# Patient Record
Sex: MALE | URBAN - METROPOLITAN AREA
[De-identification: names, ages, dates, MRNs, and addresses within clinical notes are randomized per-mention and may not be internally consistent; named-entity substitution may affect disease eponyms.]

---

## 2020-11-03 ENCOUNTER — OFFICE VISIT (OUTPATIENT)
Dept: URBAN - METROPOLITAN AREA CLINIC 13 | Facility: CLINIC | Age: 70
End: 2020-11-03
Payer: MEDICARE

## 2020-11-03 DIAGNOSIS — H25.11 AGE-RELATED NUCLEAR CATARACT, RIGHT EYE: ICD-10-CM

## 2020-11-03 PROCEDURE — 92014 COMPRE OPH EXAM EST PT 1/>: CPT | Performed by: OPHTHALMOLOGY

## 2020-11-03 PROCEDURE — 92134 CPTRZ OPH DX IMG PST SGM RTA: CPT | Performed by: OPHTHALMOLOGY

## 2020-11-03 PROCEDURE — 67028 INJECTION EYE DRUG: CPT | Performed by: OPHTHALMOLOGY

## 2020-11-03 ASSESSMENT — INTRAOCULAR PRESSURE
OD: 25
OS: 24

## 2020-11-03 NOTE — IMPRESSION/PLAN
Impression: Type 2 diab with mod nonp rtnop without macular edema, bi: Q27.9789. Bilateral.
last A1C 7.0 Plan: Tight BP/BS control encouraged. Exam and OCT OS demonstrate a small perifoveal cyst.  It is unclear at this point if this is related to DR or if it represents a degenerative cyst.  This has remained stable but will continue to observe closely.

## 2020-11-03 NOTE — IMPRESSION/PLAN
Impression: Age-related nuclear cataract, right eye: H25.11. Right. Plan: Visually significant. It appears that the cataract has not worsened with antiVEGF treatment. Cleared to proceed with CE/IOL. Would avoid MFIOL in patient with history of macular disease. Patient has follow-up with Dr. Bárbara Cruz in ~1 month.

## 2020-11-03 NOTE — IMPRESSION/PLAN
Impression: Glaucoma: H40.9. Bilateral.
s/p LPI, OS
s/p PKE/ABIC
RNFL 11/3/2020- 66,57 Plan: IOP elevated on exam today. Continue timolol, simbrinza, and Lumigan. Recommend follow up with Dr. Jacinda Ma as IOP remains elevated.

## 2020-11-03 NOTE — IMPRESSION/PLAN
Impression: Central retinal vein occls, right eye, with macular edema: H34.8110. Right. Status: Symptomatic.
s/p Avastin last 05/25/2020
- worse at 13 wks on 8/25/20 Plan: Exam and OCT confirm CRVO with improved CME at 10 wks. He continues to have a great response to anti-VEGF injection. Discussed findings and options with patient. Recommend repeat treatment today and continue with 10-11 week evaluations. R/B/A's discussed. Patient understands and elects to proceed. Intravitreal Avastin injection performed today OD without complication.  

RTC: 10-11 weeks FU with OCT mac /poss avastin x CRVO OD

## 2021-01-12 ENCOUNTER — OFFICE VISIT (OUTPATIENT)
Dept: URBAN - METROPOLITAN AREA CLINIC 13 | Facility: CLINIC | Age: 71
End: 2021-01-12
Payer: MEDICARE

## 2021-01-12 PROCEDURE — 99214 OFFICE O/P EST MOD 30 MIN: CPT | Performed by: OPHTHALMOLOGY

## 2021-01-12 PROCEDURE — 92134 CPTRZ OPH DX IMG PST SGM RTA: CPT | Performed by: OPHTHALMOLOGY

## 2021-01-12 PROCEDURE — 67028 INJECTION EYE DRUG: CPT | Performed by: OPHTHALMOLOGY

## 2021-01-12 ASSESSMENT — INTRAOCULAR PRESSURE
OD: 34
OS: 28

## 2021-01-12 NOTE — IMPRESSION/PLAN
Impression: Central retinal vein occls, right eye, with macular edema: H34.8110. Right. Status: Symptomatic. -- worse at 13 weeks on 8/25/20
-s/p Avastin last 11/03/2020 Plan: Exam and OCT confirm CRVO with worse CME at 11 wks. He continues to have a great response to anti-VEGF injection. Discussed findings and options with patient. Recommend repeat treatment today and taper to 6 week evaluations. R/B/A's discussed. Patient understands and elects to proceed. Intravitreal Avastin injection performed today OD without complication.  

RTC: 6 weeks FU with OCT [place] Fass Gilbert Paulie Avastin x CRVO OD

## 2021-01-12 NOTE — IMPRESSION/PLAN
Impression: Type 2 diab with mod nonp rtnop without macular edema, bi: B77.2385. Bilateral.
last A1C 7.0 Plan: Tight BP/BS control encouraged. Exam and OCT OS demonstrate a small perifoveal cyst.  It is unclear at this point if this is related to DR or if it represents a degenerative cyst.  This has remained stable but will continue to observe closely.

## 2021-01-12 NOTE — IMPRESSION/PLAN
Impression: Glaucoma: H40.9. Bilateral.
s/p LPI, OS
s/p PKE/ABIC
RNFL 11/3/2020- 66,57 Plan: IOP elevated on exam today. Continue timolol, simbrinza, and Lumigan. Recommend follow up with Dr. Rolo Finn as IOP remains elevated even after trab and cataract surgery.

## 2021-01-12 NOTE — IMPRESSION/PLAN
Impression: Presence of intraocular lens: Z96.1 OU. Plan: Unclear why there is a vision decline after cataract surgery OD. There is mild recurrent CME, however, this does not seem to explain decline to 20/400. Will follow closely.

## 2021-02-23 ENCOUNTER — OFFICE VISIT (OUTPATIENT)
Dept: URBAN - METROPOLITAN AREA CLINIC 13 | Facility: CLINIC | Age: 71
End: 2021-02-23
Payer: MEDICARE

## 2021-02-23 PROCEDURE — 92134 CPTRZ OPH DX IMG PST SGM RTA: CPT | Performed by: OPHTHALMOLOGY

## 2021-02-23 PROCEDURE — 92012 INTRM OPH EXAM EST PATIENT: CPT | Performed by: OPHTHALMOLOGY

## 2021-02-23 PROCEDURE — 67028 INJECTION EYE DRUG: CPT | Performed by: OPHTHALMOLOGY

## 2021-02-23 ASSESSMENT — INTRAOCULAR PRESSURE
OS: 26
OD: 27

## 2021-02-23 NOTE — IMPRESSION/PLAN
Impression: Type 2 diab with mod nonp rtnop without macular edema, bi: S15.5132. Bilateral.
last A1C 7.0 Plan: May also be contributing to he CME OD.   See plan for CRVO

## 2021-02-23 NOTE — IMPRESSION/PLAN
Impression: Central retinal vein occls, right eye, with macular edema: H34.8110. Right. Status: Symptomatic. -- worse at 13 weeks on 8/25/20
-s/p Avastin last 11/03/2020 Plan: Exam and OCT confirm CRVO with worse CME at 6 wks after his last Avastin injection. Discussed findings and options with patient. Recommend repeat treatment today and taper to 4 week evaluations. May consider switch to [facility] if CME continues to persist.  R/B/A's discussed. Patient understands and elects to proceed. Intravitreal Avastin injection performed today OD without complication.  

RTC: 4 weeks FU with OCT Vince Metzger Dub Avastin vs Eylea x CRVO OD

## 2021-03-23 ENCOUNTER — OFFICE VISIT (OUTPATIENT)
Dept: URBAN - METROPOLITAN AREA CLINIC 13 | Facility: CLINIC | Age: 71
End: 2021-03-23
Payer: MEDICARE

## 2021-03-23 PROCEDURE — 67028 INJECTION EYE DRUG: CPT | Performed by: OPHTHALMOLOGY

## 2021-03-23 PROCEDURE — 92134 CPTRZ OPH DX IMG PST SGM RTA: CPT | Performed by: OPHTHALMOLOGY

## 2021-03-23 PROCEDURE — 92012 INTRM OPH EXAM EST PATIENT: CPT | Performed by: OPHTHALMOLOGY

## 2021-03-23 ASSESSMENT — INTRAOCULAR PRESSURE
OD: 16
OS: 20

## 2021-03-23 NOTE — IMPRESSION/PLAN
Impression: Central retinal vein occls, right eye, with macular edema: H34.8110. Right. Status: Symptomatic. -- worse at 13 weeks on 8/25/20
-s/p Avastin last 02/23/2021 Plan: Exam and OCT confirm CRVO with improved CME with taper to 4 wks after his last Avastin injection. Discussed findings and options with patient. Recommend switch to [facility] today and continue with 4 week evaluations. May consider extending time between visits if CME resolves. R/B/A's discussed. Patient understands and elects to proceed. Intravitreal Eylea injection performed today OD without complication.  

RTC: 4 weeks FU with OCT Sheryl Peer Jerri Blizzard Eylea x CRVO OD

## 2021-03-23 NOTE — IMPRESSION/PLAN
Impression: Type 2 diab with mod nonp rtnop without macular edema, bi: V61.3036. Bilateral.
last A1C 7.0 Plan: May also be contributing to he CME OD.   See plan for CRVO

## 2021-03-23 NOTE — IMPRESSION/PLAN
Impression: Glaucoma: H40.9. Bilateral.
s/p LPI, OS
s/p PKE/ABIC
RNFL 11/3/2020- 66,57 Plan: IOP improved on exam today. Continue timolol, simbrinza, and Lumigan. Recommend follow up with Dr. Ximena Yanes as IOP remains elevated even after trab and cataract surgery.

## 2021-04-20 ENCOUNTER — OFFICE VISIT (OUTPATIENT)
Dept: URBAN - METROPOLITAN AREA CLINIC 13 | Facility: CLINIC | Age: 71
End: 2021-04-20
Payer: MEDICARE

## 2021-04-20 DIAGNOSIS — H40.9 GLAUCOMA: ICD-10-CM

## 2021-04-20 PROCEDURE — 67028 INJECTION EYE DRUG: CPT | Performed by: OPHTHALMOLOGY

## 2021-04-20 PROCEDURE — 92012 INTRM OPH EXAM EST PATIENT: CPT | Performed by: OPHTHALMOLOGY

## 2021-04-20 ASSESSMENT — INTRAOCULAR PRESSURE
OS: 20
OD: 19

## 2021-04-20 NOTE — IMPRESSION/PLAN
Impression: Type 2 diab with mod nonp rtnop without macular edema, bi: H75.6047. Bilateral.
last A1C 7.0 Plan: May also be contributing to he CME OD.   See plan for CRVO

## 2021-04-20 NOTE — IMPRESSION/PLAN
Impression: Central retinal vein occls, right eye, with macular edema: H34.8110. Right. Status: Symptomatic. -- worse at 13 weeks on 8/25/20
-s/p Avastin last 02/23/2021 Plan: Exam and OCT confirm CRVO with improved CME with taper to 4 wks after his last Eylea injection. Discussed findings and options with patient. Recommend Eylea today and extend to 6 week evaluation. May consider extending time between visits if CME resolves. R/B/A's discussed. Patient understands and elects to proceed. Intravitreal Eylea injection performed today OD without complication.  

RTC: 6 weeks FU with OCT Karina Sommers Eylea x CRVO OD

## 2021-04-20 NOTE — IMPRESSION/PLAN
Impression: Glaucoma: H40.9. Bilateral.
s/p LPI, OS
s/p PKE/ABIC
RNFL 11/3/2020- 66,57
s/p glaucoma tube exchange Plan: IOP improved on exam today. Continue timolol, simbrinza, and Lumigan.  Recommend follow up with Dr. Deja Peterson as IOP remains borderline

## 2021-04-20 NOTE — IMPRESSION/PLAN
Impression: Presence of intraocular lens: Z96.1 OU. Plan: Vision improved; patient has diplopia that may be related to glaucoma surgery; observe for now.

## 2021-06-08 ENCOUNTER — OFFICE VISIT (OUTPATIENT)
Dept: URBAN - METROPOLITAN AREA CLINIC 13 | Facility: CLINIC | Age: 71
End: 2021-06-08
Payer: MEDICARE

## 2021-06-08 DIAGNOSIS — H34.8110 CENTRAL RETINAL VEIN OCCLS, RIGHT EYE, WITH MACULAR EDEMA: Primary | ICD-10-CM

## 2021-06-08 PROCEDURE — 92134 CPTRZ OPH DX IMG PST SGM RTA: CPT | Performed by: OPHTHALMOLOGY

## 2021-06-08 PROCEDURE — 92012 INTRM OPH EXAM EST PATIENT: CPT | Performed by: OPHTHALMOLOGY

## 2021-06-08 PROCEDURE — 67028 INJECTION EYE DRUG: CPT | Performed by: OPHTHALMOLOGY

## 2021-06-08 ASSESSMENT — INTRAOCULAR PRESSURE
OD: 17
OS: 16

## 2021-06-08 NOTE — IMPRESSION/PLAN
Impression: Central retinal vein occls, right eye, with macular edema: H34.8110. Right. Status: Symptomatic. -- worse at 13 weeks on 8/25/20
-s/p Avastin last 02/23/2021
-s/p Eylea last 04/20/2021 Plan: Exam and OCT confirm CRVO with improved CME with extend to 6 wks after his last Eylea injection. Discussed findings and options with patient. Recommend Eylea today and extend to 7-8 week evaluation. May consider extending time between visits if CME resolves. R/B/A's discussed. Patient understands and elects to proceed. Intravitreal Eylea injection performed today OD without complication.  

RTC: 7-8 weeks FU with OCT Beth Arnett Eylea x CRVO OD

## 2021-06-08 NOTE — IMPRESSION/PLAN
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 Bilateral.
last A1C 7.0 Plan: May also be contributing to he CME OD.   See plan for CRVO

## 2021-06-08 NOTE — IMPRESSION/PLAN
Impression: Presence of intraocular lens: Z96.1 OU. Plan: Vision improved; patient has improving diplopia that may be related to glaucoma surgery; observe for now.

## 2021-06-08 NOTE — IMPRESSION/PLAN
Impression: Glaucoma: H40.9. Bilateral.
s/p LPI, OS
s/p PKE/ABIC
RNFL 11/3/2020- 66,57
s/p glaucoma tube exchange Plan: IOP improved on exam today. Continue timolol, simbrinza, and Lumigan.  Recommend follow up with Dr. Josee Valdivia as IOP remains borderline

## 2021-07-27 ENCOUNTER — OFFICE VISIT (OUTPATIENT)
Dept: URBAN - METROPOLITAN AREA CLINIC 13 | Facility: CLINIC | Age: 71
End: 2021-07-27
Payer: MEDICARE

## 2021-07-27 PROCEDURE — 92134 CPTRZ OPH DX IMG PST SGM RTA: CPT | Performed by: OPHTHALMOLOGY

## 2021-07-27 PROCEDURE — 92012 INTRM OPH EXAM EST PATIENT: CPT | Performed by: OPHTHALMOLOGY

## 2021-07-27 PROCEDURE — 67028 INJECTION EYE DRUG: CPT | Performed by: OPHTHALMOLOGY

## 2021-07-27 ASSESSMENT — INTRAOCULAR PRESSURE
OS: 16
OD: 15

## 2021-07-27 NOTE — IMPRESSION/PLAN
Impression: Central retinal vein occls, right eye, with macular edema: H34.8110. Right. Status: Symptomatic. -- worse at 13 weeks on 8/25/20
-s/p Avastin last 02/23/2021
-s/p Eylea last 06/08/2021 Plan: Exam and OCT confirm CRVO with improved CME with extend to 7 wks after his last Eylea injection. Discussed findings and options with patient. Recommend Eylea today and extend to 8 week evaluation. May consider extending time between visits if CME resolves. R/B/A's discussed. Patient understands and elects to proceed. Intravitreal Eylea injection performed today OD without complication.  

RTC: 8 weeks FU with OCT Vince Metzger Dub Eylea x CRVO OD

## 2021-07-27 NOTE — IMPRESSION/PLAN
Impression: Glaucoma: H40.9. Bilateral.
s/p LPI, OS
s/p PKE/ABIC
RNFL 11/3/2020- 66,57
s/p glaucoma tube exchange Plan: IOP improved on exam today OD. Still borderline OS. Continue timolol, simbrinza, and Lumigan.  Recommend follow up with Dr. Chencho Gill as IOP remains borderline

## 2021-09-21 ENCOUNTER — OFFICE VISIT (OUTPATIENT)
Dept: URBAN - METROPOLITAN AREA CLINIC 13 | Facility: CLINIC | Age: 71
End: 2021-09-21
Payer: MEDICARE

## 2021-09-21 DIAGNOSIS — E11.3393 TYPE 2 DIAB WITH MOD NONP RTNOP WITHOUT MACULAR EDEMA, BI: ICD-10-CM

## 2021-09-21 PROCEDURE — 92012 INTRM OPH EXAM EST PATIENT: CPT | Performed by: OPHTHALMOLOGY

## 2021-09-21 PROCEDURE — 67028 INJECTION EYE DRUG: CPT | Performed by: OPHTHALMOLOGY

## 2021-09-21 PROCEDURE — 92134 CPTRZ OPH DX IMG PST SGM RTA: CPT | Performed by: OPHTHALMOLOGY

## 2021-09-21 ASSESSMENT — INTRAOCULAR PRESSURE
OS: 18
OD: 16

## 2021-09-21 NOTE — IMPRESSION/PLAN
Impression: Central retinal vein occls, right eye, with macular edema: H34.8110. Right. Status: Symptomatic. -- worse at 13 weeks on 8/25/20
-s/p Avastin last 02/23/2021
-s/p Eylea last 07/27/2021 Plan: Exam and OCT confirm CRVO with resolved CME with extend to 8 wks after his last Eylea injection. Discussed findings and options with patient. Recommend Eylea today and extend to 10 week evaluation. R/B/A's discussed. Patient understands and elects to proceed. Intravitreal Eylea injection performed today OD without complication.  

RTC: 10 weeks FU with OCT Elaine Slater Eylea x CRVO OD

## 2021-09-21 NOTE — IMPRESSION/PLAN
Impression: Glaucoma: H40.9. Bilateral.
s/p LPI, OS
s/p PKE/ABIC
RNFL 11/3/2020- 66,57
s/p glaucoma tube exchange Plan: IOP improved on exam today OD. Still borderline OS. Continue timolol, simbrinza, and Lumigan and now rocklatan.  Recommend follow up with Dr. Vivien Sood as IOP remains borderline

## 2021-09-21 NOTE — IMPRESSION/PLAN
Impression: Type 2 diab with mod nonp rtnop without macular edema, bi: Q41.1865. Bilateral.
last A1C 7.0 Plan: May also be contributing to he CME OD.   See plan for CRVO

## 2021-11-30 ENCOUNTER — OFFICE VISIT (OUTPATIENT)
Dept: URBAN - METROPOLITAN AREA CLINIC 13 | Facility: CLINIC | Age: 71
End: 2021-11-30
Payer: MEDICARE

## 2021-11-30 DIAGNOSIS — Z96.1 PRESENCE OF INTRAOCULAR LENS: ICD-10-CM

## 2021-11-30 PROCEDURE — 67028 INJECTION EYE DRUG: CPT | Performed by: OPHTHALMOLOGY

## 2021-11-30 PROCEDURE — 92134 CPTRZ OPH DX IMG PST SGM RTA: CPT | Performed by: OPHTHALMOLOGY

## 2021-11-30 PROCEDURE — 92014 COMPRE OPH EXAM EST PT 1/>: CPT | Performed by: OPHTHALMOLOGY

## 2021-11-30 ASSESSMENT — INTRAOCULAR PRESSURE
OS: 17
OD: 15

## 2021-11-30 NOTE — IMPRESSION/PLAN
Impression: Central retinal vein occls, right eye, with macular edema: H34.8110. Right. Status: Symptomatic. -- worse at 13 weeks on 8/25/20
-s/p Avastin last 02/23/2021
-s/p Eylea last 09/21/2021 Plan: Exam and OCT confirm CRVO with slightly worse CME with extending from 8 wks to 10 wks after his last Eylea injection. Discussed findings and options with patient. Recommend Eylea today and maintain 8-10 week evaluation. R/B/A's discussed. Patient understands and elects to proceed. Intravitreal Eylea injection performed today OD without complication.  

RTC: 8-10 weeks FU with OCT Anders Anderson Eylea x CRVO OD

## 2021-11-30 NOTE — IMPRESSION/PLAN
Impression: Type 2 diab with mod nonp rtnop without macular edema, bi: A39.2924. Bilateral.
last A1C 7.0 Plan: May also be contributing to he CME OD.   See plan for CRVO

## 2021-11-30 NOTE — IMPRESSION/PLAN
Impression: Glaucoma: H40.9. Bilateral.
s/p LPI, OS, allergy to Alphagan
s/p PKE/ABIC
RNFL 11/3/2020- 66,57
s/p glaucoma tube exchange Plan: IOP improved on exam today OD. Still borderline OS. Continue timolol, simbrinza, and lumigan and now rocklatan.  Recommend follow up with Dr. Jerry Dueñas as IOP remains borderline

## 2022-01-25 ENCOUNTER — OFFICE VISIT (OUTPATIENT)
Dept: URBAN - METROPOLITAN AREA CLINIC 13 | Facility: CLINIC | Age: 72
End: 2022-01-25
Payer: MEDICARE

## 2022-01-25 PROCEDURE — 67028 INJECTION EYE DRUG: CPT | Performed by: OPHTHALMOLOGY

## 2022-01-25 PROCEDURE — 92014 COMPRE OPH EXAM EST PT 1/>: CPT | Performed by: OPHTHALMOLOGY

## 2022-01-25 PROCEDURE — 92134 CPTRZ OPH DX IMG PST SGM RTA: CPT | Performed by: OPHTHALMOLOGY

## 2022-01-25 ASSESSMENT — INTRAOCULAR PRESSURE
OS: 18
OD: 8

## 2022-01-25 NOTE — IMPRESSION/PLAN
Impression: Central retinal vein occls, right eye, with macular edema: H34.8110. Right. Status: Symptomatic. -- worse at 13 weeks on 8/25/20
-s/p Avastin last 02/23/2021
-s/p Eylea last 11/30/2021 Plan: Exam and OCT confirm CRVO with slightly worse CME with extending from 8 wks to 10 wks after his last Eylea injection. Discussed findings and options with patient. Recommend Eylea today and maintain 8-10 week evaluation. R/B/A's discussed. Patient understands and elects to proceed. Intravitreal Eylea injection performed today OD without complication.  

RTC: 8-10 weeks FU with OCT Jocelin Campa Eylea x CRVO OD

## 2022-01-25 NOTE — IMPRESSION/PLAN
Impression: Type 2 diab with mod nonp rtnop without macular edema, bi: N54.8811. Bilateral.
last A1C 7.0 Plan: May also be contributing to he CME OD.   See plan for CRVO

## 2022-01-25 NOTE — IMPRESSION/PLAN
Impression: Glaucoma: H40.9. Bilateral.
s/p LPI, OS, allergy to Alphagan
s/p PKE/ABIC
RNFL 11/3/2020- 66,57
s/p glaucoma tube exchange Plan: IOP improved on exam today OD. Still borderline OS. Continue timolol, simbrinza, and lumigan and now rocklatan.  Recommend follow up with Dr. Gordon Knowles and Kim Akers as IOP remains borderline Patient transported to [address] [place] [address] Street, RN  02/28/19 [id number]

## 2022-03-22 ENCOUNTER — OFFICE VISIT (OUTPATIENT)
Dept: URBAN - METROPOLITAN AREA CLINIC 13 | Facility: CLINIC | Age: 72
End: 2022-03-22
Payer: MEDICARE

## 2022-03-22 PROCEDURE — 67028 INJECTION EYE DRUG: CPT | Performed by: OPHTHALMOLOGY

## 2022-03-22 PROCEDURE — 99214 OFFICE O/P EST MOD 30 MIN: CPT | Performed by: OPHTHALMOLOGY

## 2022-03-22 PROCEDURE — 92134 CPTRZ OPH DX IMG PST SGM RTA: CPT | Performed by: OPHTHALMOLOGY

## 2022-03-22 ASSESSMENT — INTRAOCULAR PRESSURE
OD: 18
OS: 19

## 2022-03-22 NOTE — IMPRESSION/PLAN
Impression: Central retinal vein occls, right eye, with macular edema: H34.8110. Right. Status: Symptomatic. -- worse at 13 weeks on 8/25/20
-s/p Avastin last 02/23/2021
-s/p Eylea last 01/25/2022 Plan: Exam and OCT confirm CRVO with slightly worse CME with extending from 8 wks to 10 wks after his last Eylea injection. Discussed findings and options with patient. Recommend Eylea today and will try to extend to 11-12 week evaluation. R/B/A's discussed. Patient understands and elects to proceed. Intravitreal Eylea injection performed today OD without complication.  

RTC: 11-12 weeks FU with OCT Tej Gayle x CRVO OD

## 2022-03-22 NOTE — IMPRESSION/PLAN
Impression: Type 2 diab with mod nonp rtnop without macular edema, bi: D18.9725. Bilateral.
last A1C 7.0 Plan: May also be contributing to he CME OD.   See plan for CRVO

## 2022-03-22 NOTE — IMPRESSION/PLAN
Impression: Glaucoma: H40.9. Bilateral.
s/p LPI, OS, allergy to Alphagan
s/p PKE/ABIC
RNFL 11/3/2020- 66,57
s/p glaucoma tube exchange Plan: IOP improved on exam today OD. Still borderline OS. Continue timolol, simbrinza, and lumigan (injected as implant OS) and now rocklatan.  Recommend follow up with Dr. Quoc Galeano as IOP remains borderline

## 2022-06-28 ENCOUNTER — OFFICE VISIT (OUTPATIENT)
Dept: URBAN - METROPOLITAN AREA CLINIC 13 | Facility: CLINIC | Age: 72
End: 2022-06-28
Payer: MEDICARE

## 2022-06-28 DIAGNOSIS — E11.3393 TYPE 2 DIAB WITH MOD NONP RTNOP WITHOUT MACULAR EDEMA, BI: ICD-10-CM

## 2022-06-28 DIAGNOSIS — H34.8110 CENTRAL RETINAL VEIN OCCLS, RIGHT EYE, WITH MACULAR EDEMA: Primary | ICD-10-CM

## 2022-06-28 DIAGNOSIS — H40.9 GLAUCOMA: ICD-10-CM

## 2022-06-28 DIAGNOSIS — Z96.1 PRESENCE OF INTRAOCULAR LENS: ICD-10-CM

## 2022-06-28 PROCEDURE — 67028 INJECTION EYE DRUG: CPT | Performed by: OPHTHALMOLOGY

## 2022-06-28 PROCEDURE — 92134 CPTRZ OPH DX IMG PST SGM RTA: CPT | Performed by: OPHTHALMOLOGY

## 2022-06-28 PROCEDURE — 92014 COMPRE OPH EXAM EST PT 1/>: CPT | Performed by: OPHTHALMOLOGY

## 2022-06-28 ASSESSMENT — INTRAOCULAR PRESSURE
OD: 15
OS: 17

## 2022-09-27 ENCOUNTER — OFFICE VISIT (OUTPATIENT)
Dept: URBAN - METROPOLITAN AREA CLINIC 13 | Facility: CLINIC | Age: 72
End: 2022-09-27
Payer: MEDICARE

## 2022-09-27 DIAGNOSIS — Z96.1 PRESENCE OF INTRAOCULAR LENS: ICD-10-CM

## 2022-09-27 DIAGNOSIS — E11.3393 TYPE 2 DIAB WITH MOD NONP RTNOP WITHOUT MACULAR EDEMA, BI: ICD-10-CM

## 2022-09-27 DIAGNOSIS — H40.9 GLAUCOMA: ICD-10-CM

## 2022-09-27 DIAGNOSIS — H34.8110 CENTRAL RETINAL VEIN OCCLS, RIGHT EYE, WITH MACULAR EDEMA: Primary | ICD-10-CM

## 2022-09-27 PROCEDURE — 92134 CPTRZ OPH DX IMG PST SGM RTA: CPT | Performed by: OPHTHALMOLOGY

## 2022-09-27 PROCEDURE — 92012 INTRM OPH EXAM EST PATIENT: CPT | Performed by: OPHTHALMOLOGY

## 2022-09-27 PROCEDURE — 67028 INJECTION EYE DRUG: CPT | Performed by: OPHTHALMOLOGY

## 2022-09-27 ASSESSMENT — INTRAOCULAR PRESSURE
OS: 20
OD: 24

## 2022-09-27 NOTE — IMPRESSION/PLAN
Impression: Central retinal vein occls, right eye, with macular edema: H34.8110. Right. Status: Symptomatic. -- worse at 13 weeks on 8/25/20
-s/p Avastin last 02/23/2021
-s/p Eylea last 06/28/2022 Plan: Exam and OCT confirm CRVO with slightly worse CME with extending from 10 wks to 12 wks after his last Eylea injection. Discussed findings and options with patient. Recommend Eylea today and will maintain 11-12 week evaluation. R/B/A's discussed. Patient understands and elects to proceed. Intravitreal Eylea injection performed today OD without complication.  

RTC: 11-12 weeks FU with OCT Tej Gayle x CRVO OD

## 2022-09-27 NOTE — IMPRESSION/PLAN
Impression: Glaucoma: H40.9. Bilateral.
s/p LPI, OS, allergy to Alphagan
s/p PKE/ABIC
RNFL 11/3/2020- 66,57
s/p glaucoma tube exchange Plan: BOrderline OU. Possible progression of ON cupping OD. Continue timolol, simbrinza, and lumigan (injected as implant OS) and  and now vyzulta (off rocklatan due to irritation).  Recommend follow up with Dr. Pee Land as IOP remains borderline

## 2022-09-27 NOTE — IMPRESSION/PLAN
Impression: Type 2 diab with mod nonp rtnop without macular edema, bi: O34.3459. Bilateral.
last A1C 7.0 Plan: May also be contributing to he CME OD.   See plan for CRVO

## 2022-12-13 ENCOUNTER — OFFICE VISIT (OUTPATIENT)
Dept: URBAN - METROPOLITAN AREA CLINIC 13 | Facility: CLINIC | Age: 72
End: 2022-12-13
Payer: MEDICARE

## 2022-12-13 DIAGNOSIS — H40.9 GLAUCOMA: ICD-10-CM

## 2022-12-13 DIAGNOSIS — Z96.1 PRESENCE OF INTRAOCULAR LENS: ICD-10-CM

## 2022-12-13 DIAGNOSIS — E11.3393 TYPE 2 DIAB WITH MOD NONP RTNOP WITHOUT MACULAR EDEMA, BI: ICD-10-CM

## 2022-12-13 DIAGNOSIS — H34.8110 CENTRAL RETINAL VEIN OCCLS, RIGHT EYE, WITH MACULAR EDEMA: Primary | ICD-10-CM

## 2022-12-13 PROCEDURE — 92134 CPTRZ OPH DX IMG PST SGM RTA: CPT | Performed by: OPHTHALMOLOGY

## 2022-12-13 PROCEDURE — 92012 INTRM OPH EXAM EST PATIENT: CPT | Performed by: OPHTHALMOLOGY

## 2022-12-13 PROCEDURE — 67028 INJECTION EYE DRUG: CPT | Performed by: OPHTHALMOLOGY

## 2022-12-13 ASSESSMENT — INTRAOCULAR PRESSURE
OS: 13
OD: 12

## 2022-12-13 NOTE — IMPRESSION/PLAN
Impression: Central retinal vein occls, right eye, with macular edema: H34.8110. Right. Status: Symptomatic. -- worse at 13 weeks on 8/25/20
-s/p Avastin last 02/23/2021
-s/p Eylea last 09/27/2022 Plan: Exam and OCT confirm CRVO with improved CME tapering from 12 wks to 10 wks after his last Eylea injection. Discussed findings and options with patient. Recommend Eylea today and will maintain 10-11week evaluation. R/B/A's discussed. Patient understands and elects to proceed. Intravitreal Eylea injection performed today OD without complication.  

RTC: 10-11 weeks FU with OCT Throckmorton Fass [place] Paulie Eylea x CRVO OD

## 2022-12-13 NOTE — IMPRESSION/PLAN
Impression: Glaucoma: H40.9. Bilateral.
s/p LPI, OS, allergy to Alphagan
s/p PKE/ABIC
RNFL 11/3/2020- 66,57
s/p glaucoma tube exchange Plan: IOP improved OU. Possible progression of ON cupping OD. Continue timolol, simbrinza, and lumigan (injected as implant OS) and  and now vyzulta (off rocklatan due to irritation).  Recommend follow up with Dr. Maura Pacheco

## 2022-12-13 NOTE — IMPRESSION/PLAN
Impression: Presence of intraocular lens: Z96.1 OU. Plan: Vision improved; patient has continued diplopia that may be related to glaucoma surgery; observe for now.

## 2022-12-13 NOTE — IMPRESSION/PLAN
Impression: Type 2 diab with mod nonp rtnop without macular edema, bi: B10.6271. Bilateral.
last A1C 7.0 Plan: May also be contributing to he CME OD.   See plan for CRVO

## 2023-02-21 ENCOUNTER — OFFICE VISIT (OUTPATIENT)
Dept: URBAN - METROPOLITAN AREA CLINIC 13 | Facility: CLINIC | Age: 73
End: 2023-02-21
Payer: MEDICARE

## 2023-02-21 DIAGNOSIS — H40.9 GLAUCOMA: ICD-10-CM

## 2023-02-21 DIAGNOSIS — H34.8110 CENTRAL RETINAL VEIN OCCLS, RIGHT EYE, WITH MACULAR EDEMA: Primary | ICD-10-CM

## 2023-02-21 DIAGNOSIS — E11.3312 TYPE 2 DIAB WITH MOD NONP RTNOP WITH MACULAR EDEMA, L EYE: ICD-10-CM

## 2023-02-21 DIAGNOSIS — Z96.1 PRESENCE OF INTRAOCULAR LENS: ICD-10-CM

## 2023-02-21 PROCEDURE — 92134 CPTRZ OPH DX IMG PST SGM RTA: CPT | Performed by: OPHTHALMOLOGY

## 2023-02-21 PROCEDURE — 99214 OFFICE O/P EST MOD 30 MIN: CPT | Performed by: OPHTHALMOLOGY

## 2023-02-21 ASSESSMENT — INTRAOCULAR PRESSURE
OS: 16
OD: 16

## 2023-02-21 NOTE — IMPRESSION/PLAN
Impression: Central retinal vein occls, right eye, with macular edema: H34.8110. Right. Status: Symptomatic. -- worse at 13 weeks on 8/25/20
-s/p Avastin last 02/23/2021-> failed response with suboptimal control of CME and loss of vision
-s/p Eylea last 12/31/2022 Plan: Exam and OCT confirm CRVO with improved CME tapering from 12 wks to 10 wks after his last Eylea injection. Discussed findings and options with patient. Recommend Eylea today and will maintain 10-11week evaluation; he has a history of suboptimal response to Avastin in the past and requires Eylea for control of his CME. R/B/A's discussed. Patient understands and elects to proceed. Will schedule to obtain authorization RTC: 1-2 weeks FU with OCT mac /Optos FA OD>OS, poss Eylea OD vs OU x CRVO OD and DME OS

## 2023-02-21 NOTE — IMPRESSION/PLAN
Impression: Glaucoma: H40.9. Bilateral.
s/p LPI, OS, allergy to Alphagan
s/p PKE/ABIC
RNFL 11/3/2020- 66,57
s/p glaucoma tube exchange Plan: IOP improved OU. Possible progression of ON cupping OD. Continue timolol, simbrinza, and lumigan (injected as implant OS) and  and now vyzulta (off rocklatan due to irritation). He is transitioning care to another specialist due to insurance.  Will refer

## 2023-02-21 NOTE — IMPRESSION/PLAN
Impression: Type 2 diab with mod nonp rtnop without macular edema, bi: R79.3012. Bilateral.
last A1C 7.0 Plan: May also be contributing to he CME OD.   See plan for CRVO

## 2023-02-21 NOTE — IMPRESSION/PLAN
Impression: Type 2 diab with mod nonp rtnop with macular edema, l eye: D45.8297. Plan: Exam and OCT demosntrate DR with perifoveal DME. Rec further evaluation with FA and poss antiVEGF therapy as per above.

## 2023-02-28 ENCOUNTER — OFFICE VISIT (OUTPATIENT)
Dept: URBAN - METROPOLITAN AREA CLINIC 13 | Facility: CLINIC | Age: 73
End: 2023-02-28
Payer: MEDICARE

## 2023-02-28 DIAGNOSIS — H40.9 GLAUCOMA: ICD-10-CM

## 2023-02-28 DIAGNOSIS — E11.3312 TYPE 2 DIABETES MELLITUS WITH MODERATE NONPROLIFERATIVE DIABETIC RETINOPATHY WITH MACULAR EDEMA, LEFT EYE: ICD-10-CM

## 2023-02-28 DIAGNOSIS — H34.8110 CENTRAL RETINAL VEIN OCCLS, RIGHT EYE, WITH MACULAR EDEMA: Primary | ICD-10-CM

## 2023-02-28 DIAGNOSIS — Z96.1 PRESENCE OF INTRAOCULAR LENS: ICD-10-CM

## 2023-02-28 DIAGNOSIS — E11.3393 TYPE 2 DIAB WITH MOD NONP RTNOP WITHOUT MACULAR EDEMA, BI: ICD-10-CM

## 2023-02-28 PROCEDURE — 92134 CPTRZ OPH DX IMG PST SGM RTA: CPT | Performed by: OPHTHALMOLOGY

## 2023-02-28 PROCEDURE — 92235 FLUORESCEIN ANGRPH MLTIFRAME: CPT | Performed by: OPHTHALMOLOGY

## 2023-02-28 PROCEDURE — 92012 INTRM OPH EXAM EST PATIENT: CPT | Performed by: OPHTHALMOLOGY

## 2023-02-28 ASSESSMENT — INTRAOCULAR PRESSURE
OS: 8
OD: 8

## 2023-02-28 NOTE — IMPRESSION/PLAN
Impression: Type 2 diab with mod nonp rtnop without macular edema, bi: H68.0739. Bilateral.
last A1C 7.0 Optos FA 2/28/2023: mod-sev NPDR with leakage OS; CRVO OD with CME OD. Plan: May also be contributing to he CME OD. Exam and OCT demonstrate worsening DR with DME OS. Rec Eylea OU today. R/B/A discussed and patient elects to proceed.  See plan above

## 2023-02-28 NOTE — IMPRESSION/PLAN
Impression: Glaucoma: H40.9. Bilateral.
s/p LPI, OS, allergy to Alphagan
s/p PKE/ABIC
RNFL 11/3/2020- 66,57
s/p glaucoma tube exchange Plan: IOP improved OU. Possible progression of ON cupping OD. Continue timolol, simbrinza, and lumigan (injected as implant OS) and  and now vyzulta (off rocklatan due to irritation). He is transitioning care to another specialist due to insurance.  Referral provided at last visit

## 2023-02-28 NOTE — IMPRESSION/PLAN
Impression: Central retinal vein occls, right eye, with macular edema: H34.8110. Right. Status: Symptomatic. -- worse at 13 weeks on 8/25/20
-s/p Avastin last 02/23/2021-> failed response with suboptimal control of CME and loss of vision
-s/p Eylea last 12/31/2022 Plan: Exam and OCT confirm CRVO with tr CME at 12 wks after his last Eylea injection. Discussed findings and options with patient. Recommend Eylea today and will maintain 10-12 week evaluation; he has a history of suboptimal response to Avastin in the past and requires Eylea for control of his CME. R/B/A's discussed. Patient understands and elects to proceed. Intravitreal Eylea injected OU today without complication (OD for CRVO, OS for DME) RTC: 10-12 weeks FU with OCT mac, poss Eylea OU (CRVO OD and DME OS)

## 2023-05-09 ENCOUNTER — OFFICE VISIT (OUTPATIENT)
Dept: URBAN - METROPOLITAN AREA CLINIC 13 | Facility: CLINIC | Age: 73
End: 2023-05-09
Payer: MEDICARE

## 2023-05-09 DIAGNOSIS — Z96.1 PRESENCE OF INTRAOCULAR LENS: ICD-10-CM

## 2023-05-09 DIAGNOSIS — H34.8110 CENTRAL RETINAL VEIN OCCLS, RIGHT EYE, WITH MACULAR EDEMA: Primary | ICD-10-CM

## 2023-05-09 DIAGNOSIS — H40.9 GLAUCOMA: ICD-10-CM

## 2023-05-09 DIAGNOSIS — E11.3393 TYPE 2 DIAB WITH MOD NONP RTNOP WITHOUT MACULAR EDEMA, BI: ICD-10-CM

## 2023-05-09 DIAGNOSIS — E11.3312 TYPE 2 DIABETES MELLITUS WITH MODERATE NONPROLIFERATIVE DIABETIC RETINOPATHY WITH MACULAR EDEMA, LEFT EYE: ICD-10-CM

## 2023-05-09 PROCEDURE — 92134 CPTRZ OPH DX IMG PST SGM RTA: CPT | Performed by: OPHTHALMOLOGY

## 2023-05-09 PROCEDURE — 92014 COMPRE OPH EXAM EST PT 1/>: CPT | Performed by: OPHTHALMOLOGY

## 2023-05-09 ASSESSMENT — INTRAOCULAR PRESSURE
OD: 16
OS: 11

## 2023-05-09 NOTE — IMPRESSION/PLAN
Impression: Type 2 diab with mod nonp rtnop without macular edema, bi: I72.9101. Bilateral.
last A1C 7.0 Optos FA 2/28/2023: mod-sev NPDR with leakage OS; CRVO OD with CME OD. Plan: May also be contributing to he CME OD. Exam and OCT demonstrate worsening DR with DME OS. Rec Eylea OU today. R/B/A discussed and patient elects to proceed.  See plan above

## 2023-05-09 NOTE — IMPRESSION/PLAN
Impression: Central retinal vein occls, right eye, with macular edema: H34.8110. Right. Status: Symptomatic. -- worse at 13 weeks on 8/25/20
-s/p Avastin last 02/23/2021-> failed response with suboptimal control of CME and loss of vision
-s/p Eylea last 02/28/2023 Plan: Exam and OCT confirm CRVO with improved CME at 12 wks after his last Eylea injection. Discussed findings and options with patient. Recommend Eylea today and will maintain 10-12 week evaluation; he has a history of suboptimal response to Avastin in the past and requires Eylea for control of his CME. R/B/A's discussed. Patient understands and elects to proceed. Intravitreal Eylea injected OU today without complication (OD for CRVO, OS for DME) RTC: 10-12 weeks FU with OCT mac, poss Eylea OU (CRVO OD and DME OS)

## 2023-06-25 NOTE — IMPRESSION/PLAN
Impression: Type 2 diab with mod nonp rtnop without macular edema, bi: P50.5451. Bilateral.
last A1C 7.0 Plan: May also be contributing to he CME OD.   See plan for CRVO Statement Selected

## 2023-08-01 ENCOUNTER — OFFICE VISIT (OUTPATIENT)
Dept: URBAN - METROPOLITAN AREA CLINIC 13 | Facility: CLINIC | Age: 73
End: 2023-08-01
Payer: MEDICARE

## 2023-08-01 DIAGNOSIS — H40.9 GLAUCOMA: ICD-10-CM

## 2023-08-01 DIAGNOSIS — Z96.1 PRESENCE OF INTRAOCULAR LENS: ICD-10-CM

## 2023-08-01 DIAGNOSIS — E11.3393 TYPE 2 DIAB WITH MOD NONP RTNOP WITHOUT MACULAR EDEMA, BI: ICD-10-CM

## 2023-08-01 DIAGNOSIS — H34.8110 CENTRAL RETINAL VEIN OCCLS, RIGHT EYE, WITH MACULAR EDEMA: Primary | ICD-10-CM

## 2023-08-01 DIAGNOSIS — E11.3312 TYPE 2 DIABETES MELLITUS WITH MODERATE NONPROLIFERATIVE DIABETIC RETINOPATHY WITH MACULAR EDEMA, LEFT EYE: ICD-10-CM

## 2023-08-01 PROCEDURE — 92134 CPTRZ OPH DX IMG PST SGM RTA: CPT | Performed by: OPHTHALMOLOGY

## 2023-08-01 PROCEDURE — 99214 OFFICE O/P EST MOD 30 MIN: CPT | Performed by: OPHTHALMOLOGY

## 2023-08-01 ASSESSMENT — INTRAOCULAR PRESSURE
OS: 12
OD: 15

## 2023-10-24 ENCOUNTER — OFFICE VISIT (OUTPATIENT)
Dept: URBAN - METROPOLITAN AREA CLINIC 13 | Facility: CLINIC | Age: 73
End: 2023-10-24
Payer: MEDICARE

## 2023-10-24 DIAGNOSIS — H40.9 GLAUCOMA: ICD-10-CM

## 2023-10-24 DIAGNOSIS — H34.8110 CENTRAL RETINAL VEIN OCCLS, RIGHT EYE, WITH MACULAR EDEMA: Primary | ICD-10-CM

## 2023-10-24 DIAGNOSIS — E11.3393 TYPE 2 DIAB WITH MOD NONP RTNOP WITHOUT MACULAR EDEMA, BI: ICD-10-CM

## 2023-10-24 DIAGNOSIS — Z96.1 PRESENCE OF INTRAOCULAR LENS: ICD-10-CM

## 2023-10-24 PROCEDURE — 92134 CPTRZ OPH DX IMG PST SGM RTA: CPT | Performed by: OPHTHALMOLOGY

## 2023-10-24 PROCEDURE — 92014 COMPRE OPH EXAM EST PT 1/>: CPT | Performed by: OPHTHALMOLOGY

## 2023-10-24 ASSESSMENT — INTRAOCULAR PRESSURE
OD: 20
OS: 19

## 2024-01-16 ENCOUNTER — OFFICE VISIT (OUTPATIENT)
Dept: URBAN - METROPOLITAN AREA CLINIC 13 | Facility: CLINIC | Age: 74
End: 2024-01-16
Payer: MEDICARE

## 2024-01-16 DIAGNOSIS — Z96.1 PRESENCE OF INTRAOCULAR LENS: ICD-10-CM

## 2024-01-16 DIAGNOSIS — H34.8110 CENTRAL RETINAL VEIN OCCLS, RIGHT EYE, WITH MACULAR EDEMA: Primary | ICD-10-CM

## 2024-01-16 DIAGNOSIS — H40.9 GLAUCOMA: ICD-10-CM

## 2024-01-16 DIAGNOSIS — E11.3393 TYPE 2 DIAB WITH MOD NONP RTNOP WITHOUT MACULAR EDEMA, BI: ICD-10-CM

## 2024-01-16 PROCEDURE — 99214 OFFICE O/P EST MOD 30 MIN: CPT | Performed by: OPHTHALMOLOGY

## 2024-01-16 PROCEDURE — 92134 CPTRZ OPH DX IMG PST SGM RTA: CPT | Performed by: OPHTHALMOLOGY

## 2024-01-16 ASSESSMENT — INTRAOCULAR PRESSURE
OD: 18
OS: 12

## 2024-04-16 ENCOUNTER — OFFICE VISIT (OUTPATIENT)
Dept: URBAN - METROPOLITAN AREA CLINIC 13 | Facility: CLINIC | Age: 74
End: 2024-04-16
Payer: MEDICARE

## 2024-04-16 DIAGNOSIS — H40.9 GLAUCOMA: ICD-10-CM

## 2024-04-16 DIAGNOSIS — H34.8110 CENTRAL RETINAL VEIN OCCLUSION, RIGHT EYE, WITH MACULAR EDEMA: Primary | ICD-10-CM

## 2024-04-16 DIAGNOSIS — Z96.1 PRESENCE OF INTRAOCULAR LENS: ICD-10-CM

## 2024-04-16 DIAGNOSIS — E11.3312 TYPE 2 DIABETES MELLITUS WITH MODERATE NONPROLIFERATIVE DIABETIC RETINOPATHY WITH MACULAR EDEMA, LEFT EYE: ICD-10-CM

## 2024-04-16 DIAGNOSIS — E11.3393 TYPE 2 DIAB WITH MOD NONP RTNOP WITHOUT MACULAR EDEMA, BI: ICD-10-CM

## 2024-04-16 PROCEDURE — 92134 CPTRZ OPH DX IMG PST SGM RTA: CPT | Performed by: OPHTHALMOLOGY

## 2024-04-16 PROCEDURE — 92012 INTRM OPH EXAM EST PATIENT: CPT | Performed by: OPHTHALMOLOGY

## 2024-04-16 ASSESSMENT — INTRAOCULAR PRESSURE
OS: 10
OD: 13

## 2024-07-09 ENCOUNTER — OFFICE VISIT (OUTPATIENT)
Dept: URBAN - METROPOLITAN AREA CLINIC 13 | Facility: CLINIC | Age: 74
End: 2024-07-09
Payer: MEDICARE

## 2024-07-09 DIAGNOSIS — H40.9 GLAUCOMA: ICD-10-CM

## 2024-07-09 DIAGNOSIS — H34.8110 CENTRAL RETINAL VEIN OCCLUSION, RIGHT EYE, WITH MACULAR EDEMA: Primary | ICD-10-CM

## 2024-07-09 DIAGNOSIS — Z96.1 PRESENCE OF INTRAOCULAR LENS: ICD-10-CM

## 2024-07-09 DIAGNOSIS — E11.3393 TYPE 2 DIAB WITH MOD NONP RTNOP WITHOUT MACULAR EDEMA, BI: ICD-10-CM

## 2024-07-09 PROCEDURE — 92134 CPTRZ OPH DX IMG PST SGM RTA: CPT | Performed by: OPHTHALMOLOGY

## 2024-07-09 PROCEDURE — 99214 OFFICE O/P EST MOD 30 MIN: CPT | Performed by: OPHTHALMOLOGY

## 2024-07-09 ASSESSMENT — INTRAOCULAR PRESSURE
OD: 23
OS: 21

## 2024-10-01 ENCOUNTER — OFFICE VISIT (OUTPATIENT)
Dept: URBAN - METROPOLITAN AREA CLINIC 13 | Facility: CLINIC | Age: 74
End: 2024-10-01
Payer: MEDICARE

## 2024-10-01 DIAGNOSIS — E11.3393 TYPE 2 DIABETES MELLITUS WITH MODERATE NONPROLIFERATIVE DIABETIC RETINOPATHY WITHOUT MACULAR EDEMA, BILATERAL: ICD-10-CM

## 2024-10-01 DIAGNOSIS — Z96.1 PRESENCE OF INTRAOCULAR LENS: ICD-10-CM

## 2024-10-01 DIAGNOSIS — H34.8110 CENTRAL RETINAL VEIN OCCLS, RIGHT EYE, WITH MACULAR EDEMA: Primary | ICD-10-CM

## 2024-10-01 DIAGNOSIS — H40.9 GLAUCOMA: ICD-10-CM

## 2024-10-01 PROCEDURE — 99214 OFFICE O/P EST MOD 30 MIN: CPT | Performed by: OPHTHALMOLOGY

## 2024-10-01 PROCEDURE — 92134 CPTRZ OPH DX IMG PST SGM RTA: CPT | Performed by: OPHTHALMOLOGY

## 2024-10-01 ASSESSMENT — INTRAOCULAR PRESSURE
OD: 22
OS: 18

## 2024-12-24 ENCOUNTER — OFFICE VISIT (OUTPATIENT)
Dept: URBAN - METROPOLITAN AREA CLINIC 13 | Facility: CLINIC | Age: 74
End: 2024-12-24
Payer: MEDICARE

## 2024-12-24 DIAGNOSIS — H34.8110 CENTRAL RETINAL VEIN OCCLUSION, RIGHT EYE, WITH MACULAR EDEMA: ICD-10-CM

## 2024-12-24 DIAGNOSIS — E11.3393 TYPE 2 DIABETES MELLITUS WITH MODERATE NONPROLIFERATIVE DIABETIC RETINOPATHY WITHOUT MACULAR EDEMA, BILATERAL: Primary | ICD-10-CM

## 2024-12-24 DIAGNOSIS — H40.9 GLAUCOMA: ICD-10-CM

## 2024-12-24 DIAGNOSIS — Z96.1 PRESENCE OF INTRAOCULAR LENS: ICD-10-CM

## 2024-12-24 PROCEDURE — 92134 CPTRZ OPH DX IMG PST SGM RTA: CPT | Performed by: OPHTHALMOLOGY

## 2024-12-24 PROCEDURE — 92014 COMPRE OPH EXAM EST PT 1/>: CPT | Performed by: OPHTHALMOLOGY

## 2024-12-24 RX ORDER — NETARSUDIL AND LATANOPROST OPHTHALMIC SOLUTION, 0.02%/0.005% .2; .05 MG/ML; MG/ML
SOLUTION/ DROPS OPHTHALMIC; TOPICAL
Qty: 0 | Refills: 0 | Status: ACTIVE
Start: 2024-12-24

## 2024-12-24 ASSESSMENT — INTRAOCULAR PRESSURE
OD: 15
OS: 14

## 2025-03-20 ENCOUNTER — OFFICE VISIT (OUTPATIENT)
Dept: URBAN - METROPOLITAN AREA CLINIC 13 | Facility: CLINIC | Age: 75
End: 2025-03-20
Payer: COMMERCIAL

## 2025-03-20 DIAGNOSIS — E11.3393 TYPE 2 DIAB WITH MOD NONP RTNOP WITHOUT MACULAR EDEMA, BI: ICD-10-CM

## 2025-03-20 DIAGNOSIS — H34.8110 CENTRAL RETINAL VEIN OCCLUSION, RIGHT EYE, WITH MACULAR EDEMA: Primary | ICD-10-CM

## 2025-03-20 DIAGNOSIS — Z96.1 PRESENCE OF INTRAOCULAR LENS: ICD-10-CM

## 2025-03-20 DIAGNOSIS — H40.9 GLAUCOMA: ICD-10-CM

## 2025-03-20 PROCEDURE — 92014 COMPRE OPH EXAM EST PT 1/>: CPT | Performed by: OPHTHALMOLOGY

## 2025-03-20 PROCEDURE — 92134 CPTRZ OPH DX IMG PST SGM RTA: CPT | Performed by: OPHTHALMOLOGY

## 2025-03-20 ASSESSMENT — INTRAOCULAR PRESSURE
OS: 13
OD: 17

## 2025-06-12 ENCOUNTER — OFFICE VISIT (OUTPATIENT)
Dept: URBAN - METROPOLITAN AREA CLINIC 13 | Facility: CLINIC | Age: 75
End: 2025-06-12
Payer: COMMERCIAL

## 2025-06-12 DIAGNOSIS — H40.9 GLAUCOMA: ICD-10-CM

## 2025-06-12 DIAGNOSIS — E11.3393 TYPE 2 DIAB WITH MOD NONP RTNOP WITHOUT MACULAR EDEMA, BI: ICD-10-CM

## 2025-06-12 DIAGNOSIS — Z96.1 PRESENCE OF INTRAOCULAR LENS: ICD-10-CM

## 2025-06-12 DIAGNOSIS — H34.8110 CENTRAL RETINAL VEIN OCCLUSION, RIGHT EYE, WITH MACULAR EDEMA: Primary | ICD-10-CM

## 2025-06-12 PROCEDURE — 99214 OFFICE O/P EST MOD 30 MIN: CPT | Performed by: OPHTHALMOLOGY

## 2025-06-12 PROCEDURE — 92134 CPTRZ OPH DX IMG PST SGM RTA: CPT | Performed by: OPHTHALMOLOGY

## 2025-06-12 ASSESSMENT — INTRAOCULAR PRESSURE
OS: 14
OD: 13

## 2025-08-05 ENCOUNTER — OFFICE VISIT (OUTPATIENT)
Dept: URBAN - METROPOLITAN AREA CLINIC 13 | Facility: CLINIC | Age: 75
End: 2025-08-05
Payer: COMMERCIAL

## 2025-08-05 DIAGNOSIS — H34.8110 CENTRAL RETINAL VEIN OCCLUSION, RIGHT EYE, WITH MACULAR EDEMA: ICD-10-CM

## 2025-08-05 DIAGNOSIS — E11.3393 TYPE 2 DIABETES MELLITUS WITH MODERATE NONPROLIFERATIVE DIABETIC RETINOPATHY WITHOUT MACULAR EDEMA, BILATERAL: Primary | ICD-10-CM

## 2025-08-05 PROCEDURE — 92134 CPTRZ OPH DX IMG PST SGM RTA: CPT | Performed by: OPHTHALMOLOGY

## 2025-08-05 ASSESSMENT — INTRAOCULAR PRESSURE
OS: 15
OD: 13